# Patient Record
Sex: MALE | Race: WHITE | Employment: FULL TIME | RURAL
[De-identification: names, ages, dates, MRNs, and addresses within clinical notes are randomized per-mention and may not be internally consistent; named-entity substitution may affect disease eponyms.]

---

## 2024-06-15 ENCOUNTER — APPOINTMENT (OUTPATIENT)
Dept: GENERAL RADIOLOGY | Facility: HOSPITAL | Age: 24
End: 2024-06-15
Payer: OTHER GOVERNMENT

## 2024-06-15 ENCOUNTER — HOSPITAL ENCOUNTER (EMERGENCY)
Facility: HOSPITAL | Age: 24
Discharge: HOME | End: 2024-06-15
Attending: HOSPITALIST
Payer: OTHER GOVERNMENT

## 2024-06-15 VITALS
HEIGHT: 68 IN | WEIGHT: 165 LBS | RESPIRATION RATE: 18 BRPM | TEMPERATURE: 97.7 F | BODY MASS INDEX: 25.01 KG/M2 | DIASTOLIC BLOOD PRESSURE: 87 MMHG | HEART RATE: 50 BPM | OXYGEN SATURATION: 100 % | SYSTOLIC BLOOD PRESSURE: 119 MMHG

## 2024-06-15 DIAGNOSIS — S63.266A CLOSED DISLOCATION OF FIFTH METACARPAL BONE OF RIGHT HAND: ICD-10-CM

## 2024-06-15 DIAGNOSIS — S62.314A CLOSED DISPLACED FRACTURE OF BASE OF FOURTH METACARPAL BONE OF RIGHT HAND, INITIAL ENCOUNTER: Primary | ICD-10-CM

## 2024-06-15 PROCEDURE — 73130 X-RAY EXAM OF HAND: CPT

## 2024-06-15 PROCEDURE — 99283 EMERGENCY DEPT VISIT LOW MDM: CPT

## 2024-06-15 PROCEDURE — 29125 APPL SHORT ARM SPLINT STATIC: CPT

## 2024-06-15 PROCEDURE — 6370000000 HC RX 637 (ALT 250 FOR IP): Performed by: HOSPITALIST

## 2024-06-15 RX ORDER — IBUPROFEN 800 MG/1
800 TABLET ORAL ONCE
Status: COMPLETED | OUTPATIENT
Start: 2024-06-15 | End: 2024-06-15

## 2024-06-15 RX ADMIN — IBUPROFEN 800 MG: 800 TABLET ORAL at 14:51

## 2024-06-15 ASSESSMENT — LIFESTYLE VARIABLES
HOW MANY STANDARD DRINKS CONTAINING ALCOHOL DO YOU HAVE ON A TYPICAL DAY: PATIENT DOES NOT DRINK
HOW OFTEN DO YOU HAVE A DRINK CONTAINING ALCOHOL: NEVER

## 2024-06-15 NOTE — ED NOTES
Discharge instructions provided to patient. Patient verbalized understanding of d/c plan and follow up care. Patient provided with disc of images. Patient was placed in orthoglass splint per MD. Patient provided with arm sling per order.     Patient and friend ambulatory off unit to POV at this time with no further needs noted.
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
